# Patient Record
Sex: FEMALE | Race: BLACK OR AFRICAN AMERICAN | Employment: FULL TIME | URBAN - METROPOLITAN AREA
[De-identification: names, ages, dates, MRNs, and addresses within clinical notes are randomized per-mention and may not be internally consistent; named-entity substitution may affect disease eponyms.]

---

## 2018-12-07 ENCOUNTER — OFFICE VISIT (OUTPATIENT)
Dept: SURGERY | Age: 48
End: 2018-12-07

## 2018-12-07 ENCOUNTER — HOSPITAL ENCOUNTER (OUTPATIENT)
Dept: LAB | Age: 48
Discharge: HOME | End: 2018-12-07
Payer: COMMERCIAL

## 2018-12-07 ENCOUNTER — HOSPITAL ENCOUNTER (OUTPATIENT)
Dept: LAB | Age: 48
Discharge: HOME | End: 2018-12-07

## 2018-12-07 VITALS
RESPIRATION RATE: 18 BRPM | HEART RATE: 90 BPM | WEIGHT: 293 LBS | SYSTOLIC BLOOD PRESSURE: 134 MMHG | TEMPERATURE: 98.4 F | BODY MASS INDEX: 48.82 KG/M2 | DIASTOLIC BLOOD PRESSURE: 86 MMHG | HEIGHT: 65 IN | OXYGEN SATURATION: 98 %

## 2018-12-07 DIAGNOSIS — E66.01 MORBID OBESITY WITH BMI OF 50.0-59.9, ADULT (HCC): ICD-10-CM

## 2018-12-07 DIAGNOSIS — E66.01 MORBID OBESITY (HCC): Primary | ICD-10-CM

## 2018-12-07 DIAGNOSIS — E66.01 MORBID OBESITY (HCC): ICD-10-CM

## 2018-12-07 LAB
ATRIAL RATE: 72 BPM
CALCULATED P AXIS, ECG09: 26 DEGREES
CALCULATED R AXIS, ECG10: -30 DEGREES
CALCULATED T AXIS, ECG11: 3 DEGREES
DIAGNOSIS, 93000: NORMAL
P-R INTERVAL, ECG05: 172 MS
Q-T INTERVAL, ECG07: 394 MS
QRS DURATION, ECG06: 96 MS
QTC CALCULATION (BEZET), ECG08: 431 MS
VENTRICULAR RATE, ECG03: 72 BPM
XX-LABCORP SPECIMEN COL,LCBCF: NORMAL

## 2018-12-07 PROCEDURE — 93005 ELECTROCARDIOGRAM TRACING: CPT

## 2018-12-07 PROCEDURE — 99001 SPECIMEN HANDLING PT-LAB: CPT

## 2018-12-07 RX ORDER — BROMFENAC SODIUM 0.7 MG/ML
SOLUTION/ DROPS OPHTHALMIC
Refills: 2 | COMMUNITY
Start: 2018-11-05

## 2018-12-07 RX ORDER — FUROSEMIDE 20 MG/1
TABLET ORAL
Refills: 1 | COMMUNITY
Start: 2018-11-05

## 2018-12-07 RX ORDER — ERGOCALCIFEROL 1.25 MG/1
50000 CAPSULE ORAL
COMMUNITY
Start: 2018-11-27

## 2018-12-07 RX ORDER — DUREZOL 0.5 MG/ML
EMULSION OPHTHALMIC
Refills: 2 | COMMUNITY
Start: 2018-11-16

## 2018-12-07 NOTE — PROGRESS NOTES
Chief Complaint   Patient presents with    Advice Only     seminar confirmed     Pt ID confirmed    Weight Loss Metrics 12/7/2018 12/7/2018   Pre op / Initial Wt 360 -   Today's Wt - 360 lb   BMI - 59.91 kg/m2   Ideal Body Wt 134 -   Excess Body Wt 226 -   Goal Wt 179 -   Wt loss to date 0 -   % Wt Loss 0 -   80% .8 -       Body mass index is 59.91 kg/m².

## 2018-12-07 NOTE — LETTER
12/7/2018 9:55 AM 
 
Patient:  Maria Teresa Cochran YOB: 1970 Date of Visit: 12/7/2018 Ronna Amos NP 
[address] [facility] [address] [place] [address] VIA Facsimile: [phone] Dear Ronna Amos NP, Thank you for referring Ms. Maria Tereas Cochran to [address] [address] for evaluation and treatment. Below are the relevant portions of my assessment and plan of care. Initial Consultation for Bariatric Surgery Template (Gastric Bypass) Maria Teresa Cochran is a 51 y.o. female who comes into the office today for initial consultation for the surgical options for the treatment of morbid obesity. The patient initially identified obesity at the age of 7 and at age 24 weighed 200lbs. She has tried a variety of unsupervised weight-loss attempts including self imposed, but has yet to meet with lasting success. Maximum weight lost on a diet is about 50 lbs, but that the weight loss always seems to return. Today, the patient is  Height: 5' 5\" (165.1 cm) tall, Weight: (!) 163.3 kg (360 lb) lbs for a Body mass index is 59.91 kg/m². It is due to the patient's severe obesity, which is further complicated by hypovit D  that the patient is now seeking out bariatric surgery, specifically, sleeve gastrectomy. History reviewed. No pertinent past medical history. Past Surgical History:  
Procedure Laterality Date  HX HEENT    
 cataract right  HX OTHER SURGICAL    
 keloids removed Current Outpatient Medications Medication Sig Dispense Refill  PROLENSA 0.07 % ophthalmic solution INSTILL 1 DROP INTO OPERATVE EYE DAILY 3 DAYS PRIOR TO SURGERY, AND 6 WEEKS AFTER.  2  
 DUREZOL 0.05 % ophthalmic emulsion INSTILL IN OPERATIVE EYE 2 TIMES DAILY 3 DAYS PRIOR TO SURGERY, THEN 2 WEEKS AFTER.  2  
 ergocalciferol (ERGOCALCIFEROL) 50,000 unit capsule 50,000 Units every seven (7) days.     
 furosemide (LASIX) 20 mg tablet TAKE 1 TABLET BY MOUTH EVERY DAY  1  
 
 
 No Known Allergies Social History Tobacco Use  Smoking status: Never Smoker  Smokeless tobacco: Never Used Substance Use Topics  Alcohol use: Yes Frequency: 2-3 times a week  Drug use: No  
 
 
Family History Problem Relation Age of Onset  Cancer Father   
     multiple myeloma Family Status Relation Name Status  Mother  Alive  Father   Review of Systems:  Positive in BOLD 
 
CONST: Fever, weight loss, fatigue or chills GI: Nausea, vomiting, abdominal pain, change in bowel habits, hematochezia, melena, and GERD INTEG: Dermatitis, abnormal moles HEENT: Recent changes in vision, vertigo, epistaxis, dysphagia and hoarseness CV: Chest pain, palpitations, HTN, edema and varicosities RESP: Cough, shortness of breath, wheezing, hemoptysis, snoring and reactive airway disease : Hematuria, dysuria, frequency, urgency, nocturia and stress urinary incontinence MS: Weakness, joint pain and arthritis - foot ENDO: Diabetes, thyroid disease, polyuria, polydipsia, polyphagia, poor wound healing, heat intolerance, cold intolerance LYMPH/HEME: Anemia, bruising and history of blood transfusions NEURO: Dizziness, headache, fainting, seizures and stroke PSYCH: Anxiety and depression Physical Exam 
 
Visit Vitals BP (!) 136/94 Pulse 90 Temp 98.4 °F (36.9 °C) Resp 18 Ht 5' 5\" (1.651 m) Wt (!) 163.3 kg (360 lb) SpO2 98% BMI 59.91 kg/m² Pre op weight: 360 
EBW: 226 Wt loss to date: 0 General: 51 y.o.) female in no acute distress. Morbidly obese in breasts, abdomen, hips - gynecoid pattern HEENT: Normocephalic, atraumatic, Pupils equal and reactive, nasopharynx clear, oropharynx clear and moist without lesions NECK: Supple, no lymphadenopathy, thyromegaly, carotid bruits or jugular venous distension. trachea midline RESP: Clear to auscultation bilaterally, no wheezes, rhonchi, or rales, normal respiratory excursion CV: Regular rate and rhythm, no murmurs, rubs or gallops. 3+/4 pulses in bilateral dorsalis pedis and posterior tibialis. No distal edema or varicosities. ABD: Soft, nontender, nondistended, normoactive bowel sounds, no hernias, no hepatosplenomegaly, easily palpable costal margins, gynecoid distribution Extremities: Warm, well perfused, no tenderness or swelling, normal gait/station Neuro: Sensation and strength grossly intact and symmetrical 
Psych: Alert and oriented to person, place, and time. Impression: 
 
Ulysses Crooked is a 51 y.o. female who is suffering from morbid obesity with a BMI of 60  and comorbidities including hypvit D  who would benefit from bariatric surgery. We have had an extensive discussion with regard to the risks, benefits and likely outcomes of the operation. We've discussed the restrictive and malabsorptive nature of the gastric bypass and compared and contrasted with the sleeve gastrectomy. The patient understands the likelihood of losing approximately 80% of their excess weight in 12 to 18 months. She also understands the risks including but not limited to bleeding, infection, need for reoperation, ulcers, leaks and strictures, bowel obstruction secondary to adhesions and internal hernias, DVT, PE, heart attack, stroke, and death. Patient also understands risks of inadequate weight loss, excess weight loss, vitamin insufficiency, protein malnutrition, excess skin, and loss of hair. We have reviewed the components of a successful postoperative course including requirement for a high protein, low carbohydrate diet, 60 oz a day of zero calorie liquids, daily vitamin supplementation, daily exercise, regular follow-up, and participation in support groups.  At this time we will enroll the patient in our bariatric program, undertake routine laboratory evaluation, chest X-ray, EKG, possible UGI and evaluation by  nutritionist as well as psychologist and pending their satisfactory completion of the preop evaluation, plan to perform a gastric bypass. Thank you very much for your referral of Ms. Maximiliano Vila. If you have questions, please do not hesitate to call me. I look forward to following Ms. Candis Figueroa along with you and will keep you updated as to her progress.   
 
 
 
 
Sincerely, 
 
 
Arash Cervantes MD

## 2018-12-07 NOTE — COMMUNICATION BODY
Initial Consultation for Bariatric Surgery Template (Gastric Bypass)    Jacqueline Collet is a 51 y.o. female who comes into the office today for initial consultation for the surgical options for the treatment of morbid obesity. The patient initially identified obesity at the age of 7 and at age 24 weighed 200lbs. She has tried a variety of unsupervised weight-loss attempts including self imposed, but has yet to meet with lasting success. Maximum weight lost on a diet is about 50 lbs, but that the weight loss always seems to return. Today, the patient is  Height: 5' 5\" (165.1 cm) tall, Weight: (!) 163.3 kg (360 lb) lbs for a Body mass index is 59.91 kg/m². It is due to the patient's severe obesity, which is further complicated by hypovit D  that the patient is now seeking out bariatric surgery, specifically, sleeve gastrectomy. History reviewed. No pertinent past medical history. Past Surgical History:   Procedure Laterality Date    HX HEENT      cataract right    HX OTHER SURGICAL      keloids removed       Current Outpatient Medications   Medication Sig Dispense Refill    PROLENSA 0.07 % ophthalmic solution INSTILL 1 DROP INTO OPERATVE EYE DAILY 3 DAYS PRIOR TO SURGERY, AND 6 WEEKS AFTER.  2    DUREZOL 0.05 % ophthalmic emulsion INSTILL IN OPERATIVE EYE 2 TIMES DAILY 3 DAYS PRIOR TO SURGERY, THEN 2 WEEKS AFTER.  2    ergocalciferol (ERGOCALCIFEROL) 50,000 unit capsule 50,000 Units every seven (7) days.       furosemide (LASIX) 20 mg tablet TAKE 1 TABLET BY MOUTH EVERY DAY  1       No Known Allergies    Social History     Tobacco Use    Smoking status: Never Smoker    Smokeless tobacco: Never Used   Substance Use Topics    Alcohol use: Yes     Frequency: 2-3 times a week    Drug use: No       Family History   Problem Relation Age of Onset    Cancer Father         multiple myeloma       Family Status   Relation Name Status    Mother  Alive    Father         Review of Systems:  Positive in BOLD    CONST: Fever, weight loss, fatigue or chills  GI: Nausea, vomiting, abdominal pain, change in bowel habits, hematochezia, melena, and GERD   INTEG: Dermatitis, abnormal moles  HEENT: Recent changes in vision, vertigo, epistaxis, dysphagia and hoarseness  CV: Chest pain, palpitations, HTN, edema and varicosities  RESP: Cough, shortness of breath, wheezing, hemoptysis, snoring and reactive airway disease  : Hematuria, dysuria, frequency, urgency, nocturia and stress urinary incontinence   MS: Weakness, joint pain and arthritis - foot  ENDO: Diabetes, thyroid disease, polyuria, polydipsia, polyphagia, poor wound healing, heat intolerance, cold intolerance  LYMPH/HEME: Anemia, bruising and history of blood transfusions  NEURO: Dizziness, headache, fainting, seizures and stroke  PSYCH: Anxiety and depression      Physical Exam    Visit Vitals  BP (!) 136/94   Pulse 90   Temp 98.4 °F (36.9 °C)   Resp 18   Ht 5' 5\" (1.651 m)   Wt (!) 163.3 kg (360 lb)   SpO2 98%   BMI 59.91 kg/m²       Pre op weight: 360  EBW: 226  Wt loss to date: 0       General: 51 y.o.) female in no acute distress. Morbidly obese in breasts, abdomen, hips - gynecoid pattern  HEENT: Normocephalic, atraumatic, Pupils equal and reactive, nasopharynx clear, oropharynx clear and moist without lesions  NECK: Supple, no lymphadenopathy, thyromegaly, carotid bruits or jugular venous distension. trachea midline  RESP: Clear to auscultation bilaterally, no wheezes, rhonchi, or rales, normal respiratory excursion  CV: Regular rate and rhythm, no murmurs, rubs or gallops. 3+/4 pulses in bilateral dorsalis pedis and posterior tibialis. No distal edema or varicosities.   ABD: Soft, nontender, nondistended, normoactive bowel sounds, no hernias, no hepatosplenomegaly, easily palpable costal margins, gynecoid distribution  Extremities: Warm, well perfused, no tenderness or swelling, normal gait/station  Neuro: Sensation and strength grossly intact and symmetrical  Psych: Alert and oriented to person, place, and time. Impression:    Rafael Spence is a 51 y.o. female who is suffering from morbid obesity with a BMI of 60  and comorbidities including hypvit D  who would benefit from bariatric surgery. We have had an extensive discussion with regard to the risks, benefits and likely outcomes of the operation. We've discussed the restrictive and malabsorptive nature of the gastric bypass and compared and contrasted with the sleeve gastrectomy. The patient understands the likelihood of losing approximately 80% of their excess weight in 12 to 18 months. She also understands the risks including but not limited to bleeding, infection, need for reoperation, ulcers, leaks and strictures, bowel obstruction secondary to adhesions and internal hernias, DVT, PE, heart attack, stroke, and death. Patient also understands risks of inadequate weight loss, excess weight loss, vitamin insufficiency, protein malnutrition, excess skin, and loss of hair. We have reviewed the components of a successful postoperative course including requirement for a high protein, low carbohydrate diet, 60 oz a day of zero calorie liquids, daily vitamin supplementation, daily exercise, regular follow-up, and participation in support groups. At this time we will enroll the patient in our bariatric program, undertake routine laboratory evaluation, chest X-ray, EKG, possible UGI and evaluation by  nutritionist as well as psychologist and pending their satisfactory completion of the preop evaluation, plan to perform a gastric bypass.

## 2018-12-07 NOTE — PROGRESS NOTES
Initial Consultation for Bariatric Surgery Template (Gastric Bypass)    Sindhu Rankin is a 51 y.o. female who comes into the office today for initial consultation for the surgical options for the treatment of morbid obesity. The patient initially identified obesity at the age of 7 and at age 24 weighed 200lbs. She has tried a variety of unsupervised weight-loss attempts including self imposed, but has yet to meet with lasting success. Maximum weight lost on a diet is about 50 lbs, but that the weight loss always seems to return. Today, the patient is  Height: 5' 5\" (165.1 cm) tall, Weight: (!) 163.3 kg (360 lb) lbs for a Body mass index is 59.91 kg/m². It is due to the patient's severe obesity, which is further complicated by hypovit D  that the patient is now seeking out bariatric surgery, specifically, sleeve gastrectomy. History reviewed. No pertinent past medical history. Past Surgical History:   Procedure Laterality Date    HX HEENT      cataract right    HX OTHER SURGICAL      keloids removed       Current Outpatient Medications   Medication Sig Dispense Refill    PROLENSA 0.07 % ophthalmic solution INSTILL 1 DROP INTO OPERATVE EYE DAILY 3 DAYS PRIOR TO SURGERY, AND 6 WEEKS AFTER.  2    DUREZOL 0.05 % ophthalmic emulsion INSTILL IN OPERATIVE EYE 2 TIMES DAILY 3 DAYS PRIOR TO SURGERY, THEN 2 WEEKS AFTER.  2    ergocalciferol (ERGOCALCIFEROL) 50,000 unit capsule 50,000 Units every seven (7) days.       furosemide (LASIX) 20 mg tablet TAKE 1 TABLET BY MOUTH EVERY DAY  1       No Known Allergies    Social History     Tobacco Use    Smoking status: Never Smoker    Smokeless tobacco: Never Used   Substance Use Topics    Alcohol use: Yes     Frequency: 2-3 times a week    Drug use: No       Family History   Problem Relation Age of Onset    Cancer Father         multiple myeloma       Family Status   Relation Name Status    Mother  Alive    Father         Review of Systems:  Positive in BOLD    CONST: Fever, weight loss, fatigue or chills  GI: Nausea, vomiting, abdominal pain, change in bowel habits, hematochezia, melena, and GERD   INTEG: Dermatitis, abnormal moles  HEENT: Recent changes in vision, vertigo, epistaxis, dysphagia and hoarseness  CV: Chest pain, palpitations, HTN, edema and varicosities  RESP: Cough, shortness of breath, wheezing, hemoptysis, snoring and reactive airway disease  : Hematuria, dysuria, frequency, urgency, nocturia and stress urinary incontinence   MS: Weakness, joint pain and arthritis - foot  ENDO: Diabetes, thyroid disease, polyuria, polydipsia, polyphagia, poor wound healing, heat intolerance, cold intolerance  LYMPH/HEME: Anemia, bruising and history of blood transfusions  NEURO: Dizziness, headache, fainting, seizures and stroke  PSYCH: Anxiety and depression      Physical Exam    Visit Vitals  BP (!) 136/94   Pulse 90   Temp 98.4 °F (36.9 °C)   Resp 18   Ht 5' 5\" (1.651 m)   Wt (!) 163.3 kg (360 lb)   SpO2 98%   BMI 59.91 kg/m²       Pre op weight: 360  EBW: 226  Wt loss to date: 0       General: 51 y.o.) female in no acute distress. Morbidly obese in breasts, abdomen, hips - gynecoid pattern  HEENT: Normocephalic, atraumatic, Pupils equal and reactive, nasopharynx clear, oropharynx clear and moist without lesions  NECK: Supple, no lymphadenopathy, thyromegaly, carotid bruits or jugular venous distension. trachea midline  RESP: Clear to auscultation bilaterally, no wheezes, rhonchi, or rales, normal respiratory excursion  CV: Regular rate and rhythm, no murmurs, rubs or gallops. 3+/4 pulses in bilateral dorsalis pedis and posterior tibialis. No distal edema or varicosities.   ABD: Soft, nontender, nondistended, normoactive bowel sounds, no hernias, no hepatosplenomegaly, easily palpable costal margins, gynecoid distribution  Extremities: Warm, well perfused, no tenderness or swelling, normal gait/station  Neuro: Sensation and strength grossly intact and symmetrical  Psych: Alert and oriented to person, place, and time. Impression:    Kyara Hopson is a 51 y.o. female who is suffering from morbid obesity with a BMI of 60  and comorbidities including hypvit D  who would benefit from bariatric surgery. We have had an extensive discussion with regard to the risks, benefits and likely outcomes of the operation. We've discussed the restrictive and malabsorptive nature of the gastric bypass and compared and contrasted with the sleeve gastrectomy. The patient understands the likelihood of losing approximately 80% of their excess weight in 12 to 18 months. She also understands the risks including but not limited to bleeding, infection, need for reoperation, ulcers, leaks and strictures, bowel obstruction secondary to adhesions and internal hernias, DVT, PE, heart attack, stroke, and death. Patient also understands risks of inadequate weight loss, excess weight loss, vitamin insufficiency, protein malnutrition, excess skin, and loss of hair. We have reviewed the components of a successful postoperative course including requirement for a high protein, low carbohydrate diet, 60 oz a day of zero calorie liquids, daily vitamin supplementation, daily exercise, regular follow-up, and participation in support groups. At this time we will enroll the patient in our bariatric program, undertake routine laboratory evaluation, chest X-ray, EKG, possible UGI and evaluation by  nutritionist as well as psychologist and pending their satisfactory completion of the preop evaluation, plan to perform a gastric bypass.

## 2018-12-09 LAB — UREA BREATH TEST QL: NEGATIVE

## 2018-12-12 LAB
25(OH)D3+25(OH)D2 SERPL-MCNC: 32 NG/ML (ref 30–100)
ALBUMIN SERPL-MCNC: 3.6 G/DL (ref 3.5–5.5)
BASOPHILS # BLD AUTO: 0 X10E3/UL (ref 0–0.2)
BASOPHILS NFR BLD AUTO: 0 %
BUN SERPL-MCNC: 9 MG/DL (ref 6–24)
BUN/CREAT SERPL: 10 (ref 9–23)
CALCIUM SERPL-MCNC: 8.5 MG/DL (ref 8.7–10.2)
CHLORIDE SERPL-SCNC: 105 MMOL/L (ref 96–106)
CO2 SERPL-SCNC: 27 MMOL/L (ref 20–29)
CREAT SERPL-MCNC: 0.87 MG/DL (ref 0.57–1)
EOSINOPHIL # BLD AUTO: 0.1 X10E3/UL (ref 0–0.4)
EOSINOPHIL NFR BLD AUTO: 1 %
ERYTHROCYTE [DISTWIDTH] IN BLOOD BY AUTOMATED COUNT: 13.6 % (ref 12.3–15.4)
EST. AVERAGE GLUCOSE BLD GHB EST-MCNC: 103 MG/DL
FOLATE SERPL-MCNC: 11.3 NG/ML
GLUCOSE SERPL-MCNC: 84 MG/DL (ref 65–99)
HBA1C MFR BLD: 5.2 % (ref 4.8–5.6)
HCT VFR BLD AUTO: 40.4 % (ref 34–46.6)
HGB BLD-MCNC: 13.1 G/DL (ref 11.1–15.9)
IMM GRANULOCYTES # BLD: 0 X10E3/UL (ref 0–0.1)
IMM GRANULOCYTES NFR BLD: 0 %
IRON SERPL-MCNC: 86 UG/DL (ref 27–159)
LYMPHOCYTES # BLD AUTO: 1.4 X10E3/UL (ref 0.7–3.1)
LYMPHOCYTES NFR BLD AUTO: 20 %
MCH RBC QN AUTO: 29.8 PG (ref 26.6–33)
MCHC RBC AUTO-ENTMCNC: 32.4 G/DL (ref 31.5–35.7)
MCV RBC AUTO: 92 FL (ref 79–97)
MONOCYTES # BLD AUTO: 0.5 X10E3/UL (ref 0.1–0.9)
MONOCYTES NFR BLD AUTO: 7 %
NEUTROPHILS # BLD AUTO: 5.1 X10E3/UL (ref 1.4–7)
NEUTROPHILS NFR BLD AUTO: 72 %
PLATELET # BLD AUTO: 285 X10E3/UL (ref 150–379)
POTASSIUM SERPL-SCNC: 4.1 MMOL/L (ref 3.5–5.2)
RBC # BLD AUTO: 4.39 X10E6/UL (ref 3.77–5.28)
SODIUM SERPL-SCNC: 142 MMOL/L (ref 134–144)
TSH SERPL DL<=0.005 MIU/L-ACNC: 1.32 UIU/ML (ref 0.45–4.5)
VIT B1 BLD-SCNC: 66.8 NMOL/L (ref 66.5–200)
VIT B12 SERPL-MCNC: 489 PG/ML (ref 232–1245)
WBC # BLD AUTO: 7.1 X10E3/UL (ref 3.4–10.8)

## 2018-12-24 ENCOUNTER — TELEPHONE (OUTPATIENT)
Dept: SURGERY | Age: 48
End: 2018-12-24

## 2018-12-26 ENCOUNTER — TELEPHONE (OUTPATIENT)
Dept: SURGERY | Age: 48
End: 2018-12-26

## 2018-12-26 NOTE — TELEPHONE ENCOUNTER
LMV for patient to call office will not leave message on machine she does not state it is her phone dont want to leave personal message on phone.  patient needs to call and let her know that she needs cardiac clearance